# Patient Record
Sex: MALE | Race: WHITE
[De-identification: names, ages, dates, MRNs, and addresses within clinical notes are randomized per-mention and may not be internally consistent; named-entity substitution may affect disease eponyms.]

---

## 2018-05-31 ENCOUNTER — HOSPITAL ENCOUNTER (OUTPATIENT)
Dept: HOSPITAL 75 - REHAB | Age: 63
LOS: 32 days | Discharge: HOME | End: 2018-07-02
Attending: ORTHOPAEDIC SURGERY
Payer: COMMERCIAL

## 2018-05-31 DIAGNOSIS — M48.07: Primary | ICD-10-CM

## 2018-05-31 DIAGNOSIS — M17.0: ICD-10-CM

## 2018-08-27 ENCOUNTER — HOSPITAL ENCOUNTER (OUTPATIENT)
Dept: HOSPITAL 75 - REHAB | Age: 63
LOS: 4 days | Discharge: HOME | End: 2018-08-31
Attending: ORTHOPAEDIC SURGERY
Payer: COMMERCIAL

## 2018-08-27 DIAGNOSIS — Z47.1: Primary | ICD-10-CM

## 2018-08-27 DIAGNOSIS — Z96.651: ICD-10-CM

## 2020-07-22 ENCOUNTER — HOSPITAL ENCOUNTER (OUTPATIENT)
Dept: HOSPITAL 75 - REHAB | Age: 65
LOS: 35 days | Discharge: HOME | End: 2020-08-26
Attending: NURSE PRACTITIONER
Payer: COMMERCIAL

## 2020-07-22 DIAGNOSIS — M79.89: Primary | ICD-10-CM

## 2021-08-18 ENCOUNTER — HOSPITAL ENCOUNTER (OUTPATIENT)
Dept: HOSPITAL 75 - PREOP | Age: 66
Discharge: HOME | End: 2021-08-18
Attending: SURGERY
Payer: COMMERCIAL

## 2021-08-18 VITALS — WEIGHT: 195.33 LBS | BODY MASS INDEX: 26.75 KG/M2 | HEIGHT: 71.65 IN

## 2021-08-18 DIAGNOSIS — Z01.818: Primary | ICD-10-CM

## 2021-08-25 ENCOUNTER — HOSPITAL ENCOUNTER (OUTPATIENT)
Dept: HOSPITAL 75 - SDC | Age: 66
Discharge: HOME | End: 2021-08-25
Attending: SURGERY
Payer: COMMERCIAL

## 2021-08-25 VITALS — DIASTOLIC BLOOD PRESSURE: 92 MMHG | SYSTOLIC BLOOD PRESSURE: 140 MMHG

## 2021-08-25 VITALS — SYSTOLIC BLOOD PRESSURE: 154 MMHG | DIASTOLIC BLOOD PRESSURE: 91 MMHG

## 2021-08-25 VITALS — DIASTOLIC BLOOD PRESSURE: 96 MMHG | SYSTOLIC BLOOD PRESSURE: 149 MMHG

## 2021-08-25 VITALS — DIASTOLIC BLOOD PRESSURE: 85 MMHG | SYSTOLIC BLOOD PRESSURE: 150 MMHG

## 2021-08-25 VITALS — DIASTOLIC BLOOD PRESSURE: 91 MMHG | SYSTOLIC BLOOD PRESSURE: 128 MMHG

## 2021-08-25 VITALS — DIASTOLIC BLOOD PRESSURE: 78 MMHG | SYSTOLIC BLOOD PRESSURE: 122 MMHG

## 2021-08-25 VITALS — SYSTOLIC BLOOD PRESSURE: 146 MMHG | DIASTOLIC BLOOD PRESSURE: 93 MMHG

## 2021-08-25 VITALS — DIASTOLIC BLOOD PRESSURE: 85 MMHG | SYSTOLIC BLOOD PRESSURE: 146 MMHG

## 2021-08-25 VITALS — SYSTOLIC BLOOD PRESSURE: 152 MMHG | DIASTOLIC BLOOD PRESSURE: 95 MMHG

## 2021-08-25 VITALS — SYSTOLIC BLOOD PRESSURE: 145 MMHG | DIASTOLIC BLOOD PRESSURE: 88 MMHG

## 2021-08-25 VITALS — BODY MASS INDEX: 26.75 KG/M2 | HEIGHT: 71.65 IN | WEIGHT: 195.33 LBS

## 2021-08-25 DIAGNOSIS — D17.6: ICD-10-CM

## 2021-08-25 DIAGNOSIS — Z79.899: ICD-10-CM

## 2021-08-25 DIAGNOSIS — F32.9: ICD-10-CM

## 2021-08-25 DIAGNOSIS — I10: ICD-10-CM

## 2021-08-25 DIAGNOSIS — Z87.891: ICD-10-CM

## 2021-08-25 DIAGNOSIS — K40.90: Primary | ICD-10-CM

## 2021-08-25 PROCEDURE — 87081 CULTURE SCREEN ONLY: CPT

## 2021-08-25 PROCEDURE — 49650 LAP ING HERNIA REPAIR INIT: CPT

## 2021-08-25 NOTE — PROGRESS NOTE-POST OPERATIVE
Post-Operative Progess Note


Surgeon (s)/Assistant (s)


Surgeon


JESUS ANGUIANO DO


Assistant:  Gregory





Pre-Operative Diagnosis


Left INGUINAL HERNIA, possible right inguinal hernia





Post-Operative Diagnosis





Left Direct Inguinal 


Cord Lipoma





Procedure & Operative Findings


Date of Procedure


8/25/21


Procedure Performed/Findings


1.  Laparoscopic Left Inguinal herniarraphy with mesh placement - Robotic 

assisted


2.  Excision of Cord lipoma





After informed consent was obtained, the patient was brought to the operating


room and placed on the operating table in a supine position.  He  was sterilely


prepped and draped in a normal fashion.  Local lidocaine was used to infiltrate


the skin above the umbilicus.  I made an incision with #11 blade, carried down


to the skin into subcutaneous tissue and then deepened down the subcutaneous


tissue with Bovie electrocautery down to the fascia.  Fascia was incised with


Bovie electrocautery and bluntly entered the abdomen, swept a finger around,


placed 0 Vicryl figure-of-eight suture and placed limited trocar port under


direct visualization.  Created pneumoperitoneum, able to visualize the hernia


and took a picture of this and then placed two 8 mm ports about 10 cm on either


side of the midline port using a local lidocaine, 11 blade for stab incision and


then advanced the robotic port under direct visualization.  Once this was in, I


then placed the patient in Trendelenburg and then placed the working


instruments, the fenestrated bipolar and the scissors.  Looked on the left side 

and 


saw a large direct inguinal hernia.  No hernia defect was seen on the right 

side. Next,


I came across the peritoneum approximately 8 cm away from the hernia defect, 


going across laterally starting lateral about 17cm and cutting toward the median

umbilical 


ligament.  I then carefully dissected the visceral peritoneum away and down and 

then


in the midline, went through the parietal side and dissected down to the pubic


tubercle, dissecting this down carefully pushing the peritoneum away, I was


able to then visualize the pubic tubercle and Justo's ligament.  I went 2 cm


posterior and at this point, we then had a critical view of the dissection, able


to dissect 2 cm across the midline to the right side, 2 cm posterior to the


Justo's ligament, able to then parietalize the vas deferens and spermatic 

vessels


right at the groove between Justo's and iliac vein and able to dissect, make


sure there was no peritoneum between those two, able to see the direct hernia


space,  took a picture of this, looked at the femoral space (no hernia seen).  

Then


able to visualize the indirect hernia space. Next I looked on the cord and cord 

structures.  


There was a large  cord lipoma that I was able to reduce and cut off.  This was 

then removed 


through the port to get it out of the peritoneal space.  I could clearly see the

inguinal canal 


and the indirect space.  Next I carried the posterior lateral dissection all the

way out


and then placed a  10  x 16 Midwieght Bard 3D mesh.  It laid in nicely, covered 

the


hernia defect and the rest of the area; it was above the peritoneum.  I sutured 

it at


the pubic tubercle with a 3-0 Vicryl suture and tied this off.  This appeared to

lay in very nicely.  


I then brought down the pneumoperitoneum  to about 8 mmHg and then started 

closing 


the peritoneum.  Started medially and used a 2-0 V-lock barbed suture to start a


running stitch to close the peritoneum.  This was closed nicely, took a picture


of the closure at this point, then removed both needles had switched to a suture


 from the scissors.  The patient was then placed back supine, removed all


ports under direct visualization, allowed pneumoperitoneum to escape and then


closed the supraumbilical incision, closing the fascia with 0 Vicryl suture


previously placed.  Copiously irrigated all incisions and then closed the two


small 8 mm incisions with two interrupted 4-0 undyed Monocryl subcuticular


stitches and closed the supraumbilical incision with three interrupted undyed


Monocryl subcuticular stitch.  Area was cleaned and dried.  Dermabond was


placed.  The patient tolerated the procedure.  The sponge, instrument and needle


counts were correct at the end of the case.





Dr. Jenkins assisted during this surgery by making incisions, closing incisions, 


helping to identify anatomy and passing/retrieving suture and needles.


Anesthesia Type


GET





Estimated Blood Loss


Estimated blood loss (mL):  scant





Specimens/Packing


Specimens Removed


cord lipoma











JESUS ANGUIANO DO               Aug 25, 2021 09:58

## 2021-08-25 NOTE — ANESTHESIA-GENERAL POST-OP
General


Patient Condition


Mental Status/LOC:  Same as Preop


Cardiovascular:  Satisfactory


Nausea/Vomiting:  Absent


Respiratory:  Satisfactory


Pain:  Controlled


Complications:  Absent





Post Op Complications


Complications


None





Follow Up Care/Instructions


Patient Instructions


None needed.





Anesthesia/Patient Condition


Patient Condition


Patient is doing well, no complaints, stable vital signs, no apparent adverse 

anesthesia problems.   


No complications reported per nursing.


D/C home per [facility] Criteria:  Yes











GINETTE GARCIA CRNA           Aug 25, 2021 13:15

## 2021-08-25 NOTE — DISCHARGE INST-SURGICAL
Discharge Inst-Surgical


Depart Medication/Instructions


New, Converted or Re-Newed RX:  Transmitted to Pharmacy


Patient Instructions


Follow up Appt:


Make appointment for 1 week. [phone]





Instructions:


No lifting greater than 20 pounds.


No strenuous activity. 


May shower in 24 hours, no tub bath or soaking.


Use incentive spirometer at home as directed.


No Smoking





Skin/Wound Care:


May remove bandages in am.  You need to leave the Dermabond on incision it will 

fall off on it's own. 





Symptoms to Report:


Appetite Changes, Extremity Discoloration, Numbness/Tingling, Swelling 

Increased, Bleeding Excessive, Eyesight Changes, Pain Increased, Urine Color 

Change, Constipation(Persistent), Fever over 101 degree F, Pain/Pressure in 

chest, Urinating Difficulty, Cough Up/Vomit Blood, Heart Beat Irreg/Pounding, 

Pain/Pressure in jaw, Cramps in feet or legs, Lightheadedness, Pain/Pressure in 

shoulder, Diarrhea(Persistent), Memory Changes Suddenly, Questions/Concerns, 

Weight gain consecutive days, Dizziness/Fainting, Nausea/Vomiting, Shortness of 

Breath, Weight gain over 2 pounds








If questions or concerns contact your physician 


Or seek help at emergency department.





Activity


Activity Instructions:  Avoid Stress to Incision


Driving Instructions:  No Driving/Refer to 





Diet


Discharge Diet:  No Restrictions


Diet After 24 Hours:  Clear Liquid if Nauseous


If Any Problems/Questions/Issu:  Contact Your Physician, Go to Emergency Room





Skin/Wound Care


Infection Signs and Symptoms:  Increased Redness, Foul Odor of Wound, Increased 

Drainage, Skin Itchy or Has a Rash, Increased Swelling, Temperature Above 101  F


Wound Care Comment:  


heating pad to shoulder or neck tonight for pain


Bathing Instructions:  Shower


Stitches/Staples/Dermabond Dis:  Dermabond


Ice Pack:  Ice On and Off Site











JESUS ANGUIANO DO               Aug 25, 2021 10:01

## 2023-04-28 ENCOUNTER — HOSPITAL ENCOUNTER (OUTPATIENT)
Dept: HOSPITAL 75 - REHAB | Age: 68
LOS: 2 days | Discharge: HOME | End: 2023-04-30
Attending: ORTHOPAEDIC SURGERY
Payer: COMMERCIAL

## 2023-04-28 DIAGNOSIS — M47.812: Primary | ICD-10-CM

## 2023-05-11 ENCOUNTER — HOSPITAL ENCOUNTER (OUTPATIENT)
Dept: HOSPITAL 75 - REHAB | Age: 68
LOS: 20 days | Discharge: HOME | End: 2023-05-31
Attending: ORTHOPAEDIC SURGERY
Payer: COMMERCIAL

## 2023-05-11 DIAGNOSIS — I10: ICD-10-CM

## 2023-05-11 DIAGNOSIS — M47.812: Primary | ICD-10-CM
